# Patient Record
Sex: FEMALE | Race: WHITE | NOT HISPANIC OR LATINO | Employment: OTHER | URBAN - METROPOLITAN AREA
[De-identification: names, ages, dates, MRNs, and addresses within clinical notes are randomized per-mention and may not be internally consistent; named-entity substitution may affect disease eponyms.]

---

## 2022-12-03 ENCOUNTER — APPOINTMENT (OUTPATIENT)
Dept: RADIOLOGY | Facility: HOSPITAL | Age: 87
DRG: 193 | End: 2022-12-03
Attending: STUDENT IN AN ORGANIZED HEALTH CARE EDUCATION/TRAINING PROGRAM
Payer: COMMERCIAL

## 2022-12-03 ENCOUNTER — HOSPITAL ENCOUNTER (INPATIENT)
Facility: HOSPITAL | Age: 87
LOS: 3 days | Discharge: HOME | DRG: 193 | End: 2022-12-06
Attending: EMERGENCY MEDICINE | Admitting: FAMILY MEDICINE
Payer: COMMERCIAL

## 2022-12-03 DIAGNOSIS — R09.02 HYPOXIA: ICD-10-CM

## 2022-12-03 DIAGNOSIS — E87.1 HYPONATREMIA: ICD-10-CM

## 2022-12-03 DIAGNOSIS — J18.9 MULTIFOCAL PNEUMONIA: ICD-10-CM

## 2022-12-03 DIAGNOSIS — J10.1 INFLUENZA A: ICD-10-CM

## 2022-12-03 PROBLEM — Z66 DNR (DO NOT RESUSCITATE): Status: ACTIVE | Noted: 2021-12-09

## 2022-12-03 PROBLEM — M54.32 SCIATICA OF LEFT SIDE: Status: ACTIVE | Noted: 2017-03-14

## 2022-12-03 PROBLEM — Z96.651 STATUS POST TOTAL RIGHT KNEE REPLACEMENT: Status: ACTIVE | Noted: 2017-07-25

## 2022-12-03 PROBLEM — I10 HTN (HYPERTENSION): Status: ACTIVE | Noted: 2017-09-12

## 2022-12-03 PROBLEM — E88.819 INSULIN RESISTANCE: Status: ACTIVE | Noted: 2021-12-09

## 2022-12-03 PROBLEM — M17.11 PRIMARY OSTEOARTHRITIS OF RIGHT KNEE: Status: ACTIVE | Noted: 2017-07-25

## 2022-12-03 PROBLEM — M17.0 ARTHRITIS OF BOTH KNEES: Status: ACTIVE | Noted: 2017-03-14

## 2022-12-03 PROBLEM — M17.12 PRIMARY OSTEOARTHRITIS OF LEFT KNEE: Status: ACTIVE | Noted: 2017-09-12

## 2022-12-03 LAB
ALBUMIN SERPL BCG-MCNC: 3.5 G/DL (ref 3.5–5.2)
ALP SERPL-CCNC: 50 U/L (ref 35–104)
ALT SERPL W P-5'-P-CCNC: 29 U/L (ref 10–35)
ANION GAP SERPL CALCULATED.3IONS-SCNC: 12 MMOL/L (ref 7–15)
AST SERPL W P-5'-P-CCNC: 57 U/L (ref 10–35)
BASOPHILS # BLD AUTO: 0 10E3/UL (ref 0–0.2)
BASOPHILS NFR BLD AUTO: 0 %
BILIRUB SERPL-MCNC: 0.9 MG/DL
BUN SERPL-MCNC: 7.9 MG/DL (ref 8–23)
CALCIUM SERPL-MCNC: 8.4 MG/DL (ref 8.8–10.2)
CHLORIDE SERPL-SCNC: 84 MMOL/L (ref 98–107)
CREAT SERPL-MCNC: 0.63 MG/DL (ref 0.51–0.95)
DEPRECATED HCO3 PLAS-SCNC: 23 MMOL/L (ref 22–29)
EOSINOPHIL # BLD AUTO: 0 10E3/UL (ref 0–0.7)
EOSINOPHIL NFR BLD AUTO: 0 %
ERYTHROCYTE [DISTWIDTH] IN BLOOD BY AUTOMATED COUNT: 12.2 % (ref 10–15)
FLUAV RNA SPEC QL NAA+PROBE: POSITIVE
FLUBV RNA RESP QL NAA+PROBE: NEGATIVE
GFR SERPL CREATININE-BSD FRML MDRD: 86 ML/MIN/1.73M2
GLUCOSE SERPL-MCNC: 134 MG/DL (ref 70–99)
HCT VFR BLD AUTO: 32.3 % (ref 35–47)
HGB BLD-MCNC: 11.3 G/DL (ref 11.7–15.7)
IMM GRANULOCYTES # BLD: 0.1 10E3/UL
IMM GRANULOCYTES NFR BLD: 1 %
LYMPHOCYTES # BLD AUTO: 0.6 10E3/UL (ref 0.8–5.3)
LYMPHOCYTES NFR BLD AUTO: 5 %
MCH RBC QN AUTO: 30.8 PG (ref 26.5–33)
MCHC RBC AUTO-ENTMCNC: 35 G/DL (ref 31.5–36.5)
MCV RBC AUTO: 88 FL (ref 78–100)
MONOCYTES # BLD AUTO: 1 10E3/UL (ref 0–1.3)
MONOCYTES NFR BLD AUTO: 8 %
NEUTROPHILS # BLD AUTO: 10.5 10E3/UL (ref 1.6–8.3)
NEUTROPHILS NFR BLD AUTO: 86 %
NRBC # BLD AUTO: 0 10E3/UL
NRBC BLD AUTO-RTO: 0 /100
PLATELET # BLD AUTO: 188 10E3/UL (ref 150–450)
POTASSIUM SERPL-SCNC: 3.5 MMOL/L (ref 3.4–5.3)
PROT SERPL-MCNC: 6.4 G/DL (ref 6.4–8.3)
RBC # BLD AUTO: 3.67 10E6/UL (ref 3.8–5.2)
RSV RNA SPEC NAA+PROBE: NEGATIVE
SARS-COV-2 RNA RESP QL NAA+PROBE: NEGATIVE
SODIUM SERPL-SCNC: 119 MMOL/L (ref 136–145)
WBC # BLD AUTO: 12.1 10E3/UL (ref 4–11)

## 2022-12-03 PROCEDURE — 87637 SARSCOV2&INF A&B&RSV AMP PRB: CPT | Performed by: STUDENT IN AN ORGANIZED HEALTH CARE EDUCATION/TRAINING PROGRAM

## 2022-12-03 PROCEDURE — 36415 COLL VENOUS BLD VENIPUNCTURE: CPT | Performed by: EMERGENCY MEDICINE

## 2022-12-03 PROCEDURE — 93005 ELECTROCARDIOGRAM TRACING: CPT | Performed by: EMERGENCY MEDICINE

## 2022-12-03 PROCEDURE — 80053 COMPREHEN METABOLIC PANEL: CPT | Performed by: EMERGENCY MEDICINE

## 2022-12-03 PROCEDURE — 85025 COMPLETE CBC W/AUTO DIFF WBC: CPT | Performed by: EMERGENCY MEDICINE

## 2022-12-03 PROCEDURE — 120N000001 HC R&B MED SURG/OB

## 2022-12-03 PROCEDURE — 99285 EMERGENCY DEPT VISIT HI MDM: CPT | Mod: 25

## 2022-12-03 PROCEDURE — C9803 HOPD COVID-19 SPEC COLLECT: HCPCS

## 2022-12-03 PROCEDURE — 99223 1ST HOSP IP/OBS HIGH 75: CPT | Performed by: FAMILY MEDICINE

## 2022-12-03 PROCEDURE — 71046 X-RAY EXAM CHEST 2 VIEWS: CPT

## 2022-12-03 RX ORDER — ALBUTEROL SULFATE 0.83 MG/ML
3 SOLUTION RESPIRATORY (INHALATION)
Status: DISCONTINUED | OUTPATIENT
Start: 2022-12-03 | End: 2022-12-06 | Stop reason: HOSPADM

## 2022-12-03 RX ORDER — CEFTRIAXONE 1 G/1
1 INJECTION, POWDER, FOR SOLUTION INTRAMUSCULAR; INTRAVENOUS ONCE
Status: DISCONTINUED | OUTPATIENT
Start: 2022-12-03 | End: 2022-12-03

## 2022-12-03 RX ORDER — AZITHROMYCIN 250 MG/1
500 TABLET, FILM COATED ORAL ONCE
Status: DISCONTINUED | OUTPATIENT
Start: 2022-12-03 | End: 2022-12-03

## 2022-12-03 RX ORDER — GUAIFENESIN 200 MG/10ML
10 LIQUID ORAL EVERY 4 HOURS PRN
Status: DISCONTINUED | OUTPATIENT
Start: 2022-12-03 | End: 2022-12-06 | Stop reason: HOSPADM

## 2022-12-03 RX ORDER — IPRATROPIUM BROMIDE AND ALBUTEROL SULFATE 2.5; .5 MG/3ML; MG/3ML
3 SOLUTION RESPIRATORY (INHALATION) 4 TIMES DAILY
Status: DISCONTINUED | OUTPATIENT
Start: 2022-12-04 | End: 2022-12-05

## 2022-12-03 RX ORDER — CEFTRIAXONE 2 G/1
2 INJECTION, POWDER, FOR SOLUTION INTRAMUSCULAR; INTRAVENOUS EVERY 24 HOURS
Status: DISCONTINUED | OUTPATIENT
Start: 2022-12-03 | End: 2022-12-06

## 2022-12-03 RX ORDER — SODIUM CHLORIDE 9 MG/ML
INJECTION, SOLUTION INTRAVENOUS CONTINUOUS
Status: DISCONTINUED | OUTPATIENT
Start: 2022-12-03 | End: 2022-12-06

## 2022-12-03 RX ORDER — IPRATROPIUM BROMIDE AND ALBUTEROL SULFATE 2.5; .5 MG/3ML; MG/3ML
3 SOLUTION RESPIRATORY (INHALATION) ONCE
Status: COMPLETED | OUTPATIENT
Start: 2022-12-03 | End: 2022-12-04

## 2022-12-03 RX ORDER — LIDOCAINE 40 MG/G
CREAM TOPICAL
Status: DISCONTINUED | OUTPATIENT
Start: 2022-12-03 | End: 2022-12-06 | Stop reason: HOSPADM

## 2022-12-03 ASSESSMENT — ENCOUNTER SYMPTOMS
SHORTNESS OF BREATH: 1
VOMITING: 0
COUGH: 1
APPETITE CHANGE: 1
FEVER: 1
FATIGUE: 1
DIARRHEA: 1

## 2022-12-03 ASSESSMENT — ACTIVITIES OF DAILY LIVING (ADL): ADLS_ACUITY_SCORE: 33

## 2022-12-04 ENCOUNTER — APPOINTMENT (OUTPATIENT)
Dept: OCCUPATIONAL THERAPY | Facility: HOSPITAL | Age: 87
DRG: 193 | End: 2022-12-04
Attending: INTERNAL MEDICINE
Payer: COMMERCIAL

## 2022-12-04 LAB
ANION GAP SERPL CALCULATED.3IONS-SCNC: 8 MMOL/L (ref 7–15)
BASOPHILS # BLD AUTO: 0 10E3/UL (ref 0–0.2)
BASOPHILS NFR BLD AUTO: 0 %
BUN SERPL-MCNC: 6.6 MG/DL (ref 8–23)
CALCIUM SERPL-MCNC: 7.9 MG/DL (ref 8.8–10.2)
CHLORIDE SERPL-SCNC: 94 MMOL/L (ref 98–107)
CREAT SERPL-MCNC: 0.62 MG/DL (ref 0.51–0.95)
DEPRECATED HCO3 PLAS-SCNC: 27 MMOL/L (ref 22–29)
EOSINOPHIL # BLD AUTO: 0 10E3/UL (ref 0–0.7)
EOSINOPHIL NFR BLD AUTO: 0 %
ERYTHROCYTE [DISTWIDTH] IN BLOOD BY AUTOMATED COUNT: 12.2 % (ref 10–15)
GFR SERPL CREATININE-BSD FRML MDRD: 86 ML/MIN/1.73M2
GLUCOSE SERPL-MCNC: 105 MG/DL (ref 70–99)
HCT VFR BLD AUTO: 29.2 % (ref 35–47)
HGB BLD-MCNC: 10 G/DL (ref 11.7–15.7)
IMM GRANULOCYTES # BLD: 0.1 10E3/UL
IMM GRANULOCYTES NFR BLD: 1 %
LYMPHOCYTES # BLD AUTO: 1 10E3/UL (ref 0.8–5.3)
LYMPHOCYTES NFR BLD AUTO: 11 %
MAGNESIUM SERPL-MCNC: 2 MG/DL (ref 1.7–2.3)
MCH RBC QN AUTO: 30.4 PG (ref 26.5–33)
MCHC RBC AUTO-ENTMCNC: 34.2 G/DL (ref 31.5–36.5)
MCV RBC AUTO: 89 FL (ref 78–100)
MONOCYTES # BLD AUTO: 0.9 10E3/UL (ref 0–1.3)
MONOCYTES NFR BLD AUTO: 9 %
NEUTROPHILS # BLD AUTO: 7.5 10E3/UL (ref 1.6–8.3)
NEUTROPHILS NFR BLD AUTO: 79 %
NRBC # BLD AUTO: 0 10E3/UL
NRBC BLD AUTO-RTO: 0 /100
PLATELET # BLD AUTO: 147 10E3/UL (ref 150–450)
POTASSIUM SERPL-SCNC: 3.1 MMOL/L (ref 3.4–5.3)
POTASSIUM SERPL-SCNC: 3.5 MMOL/L (ref 3.4–5.3)
RBC # BLD AUTO: 3.29 10E6/UL (ref 3.8–5.2)
SODIUM SERPL-SCNC: 124 MMOL/L (ref 136–145)
SODIUM SERPL-SCNC: 129 MMOL/L (ref 136–145)
WBC # BLD AUTO: 9.4 10E3/UL (ref 4–11)

## 2022-12-04 PROCEDURE — 87205 SMEAR GRAM STAIN: CPT | Performed by: FAMILY MEDICINE

## 2022-12-04 PROCEDURE — 85025 COMPLETE CBC W/AUTO DIFF WBC: CPT | Performed by: FAMILY MEDICINE

## 2022-12-04 PROCEDURE — 99233 SBSQ HOSP IP/OBS HIGH 50: CPT | Performed by: INTERNAL MEDICINE

## 2022-12-04 PROCEDURE — 250N000009 HC RX 250: Performed by: FAMILY MEDICINE

## 2022-12-04 PROCEDURE — 97110 THERAPEUTIC EXERCISES: CPT | Mod: GO

## 2022-12-04 PROCEDURE — 84295 ASSAY OF SERUM SODIUM: CPT | Performed by: FAMILY MEDICINE

## 2022-12-04 PROCEDURE — 250N000013 HC RX MED GY IP 250 OP 250 PS 637: Performed by: INTERNAL MEDICINE

## 2022-12-04 PROCEDURE — 94640 AIRWAY INHALATION TREATMENT: CPT | Mod: 76

## 2022-12-04 PROCEDURE — 120N000001 HC R&B MED SURG/OB

## 2022-12-04 PROCEDURE — 999N000157 HC STATISTIC RCP TIME EA 10 MIN

## 2022-12-04 PROCEDURE — 250N000013 HC RX MED GY IP 250 OP 250 PS 637: Performed by: FAMILY MEDICINE

## 2022-12-04 PROCEDURE — 84132 ASSAY OF SERUM POTASSIUM: CPT | Performed by: INTERNAL MEDICINE

## 2022-12-04 PROCEDURE — 258N000003 HC RX IP 258 OP 636: Performed by: EMERGENCY MEDICINE

## 2022-12-04 PROCEDURE — 36415 COLL VENOUS BLD VENIPUNCTURE: CPT | Performed by: FAMILY MEDICINE

## 2022-12-04 PROCEDURE — 250N000009 HC RX 250: Performed by: EMERGENCY MEDICINE

## 2022-12-04 PROCEDURE — 250N000011 HC RX IP 250 OP 636: Performed by: FAMILY MEDICINE

## 2022-12-04 PROCEDURE — 258N000003 HC RX IP 258 OP 636: Performed by: INTERNAL MEDICINE

## 2022-12-04 PROCEDURE — 83735 ASSAY OF MAGNESIUM: CPT | Performed by: INTERNAL MEDICINE

## 2022-12-04 PROCEDURE — 36415 COLL VENOUS BLD VENIPUNCTURE: CPT | Performed by: INTERNAL MEDICINE

## 2022-12-04 PROCEDURE — 97165 OT EVAL LOW COMPLEX 30 MIN: CPT | Mod: GO

## 2022-12-04 PROCEDURE — 258N000003 HC RX IP 258 OP 636: Performed by: FAMILY MEDICINE

## 2022-12-04 PROCEDURE — 87077 CULTURE AEROBIC IDENTIFY: CPT | Performed by: FAMILY MEDICINE

## 2022-12-04 RX ORDER — ACETAMINOPHEN 325 MG/1
650 TABLET ORAL EVERY 4 HOURS PRN
Status: DISCONTINUED | OUTPATIENT
Start: 2022-12-04 | End: 2022-12-06 | Stop reason: HOSPADM

## 2022-12-04 RX ORDER — ONDANSETRON 2 MG/ML
4 INJECTION INTRAMUSCULAR; INTRAVENOUS EVERY 6 HOURS PRN
Status: DISCONTINUED | OUTPATIENT
Start: 2022-12-04 | End: 2022-12-06 | Stop reason: HOSPADM

## 2022-12-04 RX ORDER — HYDROXYZINE HYDROCHLORIDE 25 MG/1
50 TABLET, FILM COATED ORAL EVERY 6 HOURS PRN
Status: DISCONTINUED | OUTPATIENT
Start: 2022-12-04 | End: 2022-12-06 | Stop reason: HOSPADM

## 2022-12-04 RX ORDER — ACETAMINOPHEN 650 MG/1
650 SUPPOSITORY RECTAL EVERY 4 HOURS PRN
Status: DISCONTINUED | OUTPATIENT
Start: 2022-12-04 | End: 2022-12-06 | Stop reason: HOSPADM

## 2022-12-04 RX ORDER — PANTOPRAZOLE SODIUM 40 MG/1
40 TABLET, DELAYED RELEASE ORAL
Status: DISCONTINUED | OUTPATIENT
Start: 2022-12-05 | End: 2022-12-06 | Stop reason: HOSPADM

## 2022-12-04 RX ORDER — HYDROXYZINE HYDROCHLORIDE 25 MG/1
25 TABLET, FILM COATED ORAL EVERY 6 HOURS PRN
Status: DISCONTINUED | OUTPATIENT
Start: 2022-12-04 | End: 2022-12-06 | Stop reason: HOSPADM

## 2022-12-04 RX ORDER — POTASSIUM CHLORIDE 1500 MG/1
40 TABLET, EXTENDED RELEASE ORAL ONCE
Status: COMPLETED | OUTPATIENT
Start: 2022-12-04 | End: 2022-12-04

## 2022-12-04 RX ORDER — BENZONATATE 100 MG/1
100 CAPSULE ORAL 3 TIMES DAILY PRN
Status: DISCONTINUED | OUTPATIENT
Start: 2022-12-04 | End: 2022-12-06 | Stop reason: HOSPADM

## 2022-12-04 RX ORDER — POTASSIUM CHLORIDE 1500 MG/1
20 TABLET, EXTENDED RELEASE ORAL ONCE
Status: COMPLETED | OUTPATIENT
Start: 2022-12-04 | End: 2022-12-04

## 2022-12-04 RX ADMIN — IPRATROPIUM BROMIDE AND ALBUTEROL SULFATE 3 ML: 2.5; .5 SOLUTION RESPIRATORY (INHALATION) at 16:08

## 2022-12-04 RX ADMIN — AZITHROMYCIN 500 MG: 500 INJECTION, POWDER, LYOPHILIZED, FOR SOLUTION INTRAVENOUS at 01:02

## 2022-12-04 RX ADMIN — CEFTRIAXONE 2 G: 2 INJECTION, POWDER, FOR SOLUTION INTRAMUSCULAR; INTRAVENOUS at 00:51

## 2022-12-04 RX ADMIN — GUAIFENESIN 10 ML: 200 SOLUTION ORAL at 10:45

## 2022-12-04 RX ADMIN — GUAIFENESIN 10 ML: 200 SOLUTION ORAL at 22:13

## 2022-12-04 RX ADMIN — SODIUM CHLORIDE: 9 INJECTION, SOLUTION INTRAVENOUS at 14:30

## 2022-12-04 RX ADMIN — AZITHROMYCIN 250 MG: 500 INJECTION, POWDER, LYOPHILIZED, FOR SOLUTION INTRAVENOUS at 22:13

## 2022-12-04 RX ADMIN — IPRATROPIUM BROMIDE AND ALBUTEROL SULFATE 3 ML: 2.5; .5 SOLUTION RESPIRATORY (INHALATION) at 11:59

## 2022-12-04 RX ADMIN — SODIUM CHLORIDE 500 ML: 9 INJECTION, SOLUTION INTRAVENOUS at 00:10

## 2022-12-04 RX ADMIN — POTASSIUM CHLORIDE 40 MEQ: 1500 TABLET, EXTENDED RELEASE ORAL at 09:51

## 2022-12-04 RX ADMIN — IPRATROPIUM BROMIDE AND ALBUTEROL SULFATE 3 ML: 2.5; .5 SOLUTION RESPIRATORY (INHALATION) at 07:54

## 2022-12-04 RX ADMIN — SODIUM CHLORIDE: 9 INJECTION, SOLUTION INTRAVENOUS at 01:03

## 2022-12-04 RX ADMIN — POTASSIUM CHLORIDE 20 MEQ: 1500 TABLET, EXTENDED RELEASE ORAL at 16:31

## 2022-12-04 RX ADMIN — IPRATROPIUM BROMIDE AND ALBUTEROL SULFATE 3 ML: 2.5; .5 SOLUTION RESPIRATORY (INHALATION) at 22:13

## 2022-12-04 RX ADMIN — HYDROXYZINE HYDROCHLORIDE 25 MG: 25 TABLET, FILM COATED ORAL at 22:36

## 2022-12-04 RX ADMIN — IPRATROPIUM BROMIDE AND ALBUTEROL SULFATE 3 ML: 2.5; .5 SOLUTION RESPIRATORY (INHALATION) at 00:10

## 2022-12-04 ASSESSMENT — ACTIVITIES OF DAILY LIVING (ADL)
ADLS_ACUITY_SCORE: 35
ADLS_ACUITY_SCORE: 37
ADLS_ACUITY_SCORE: 35
ADLS_ACUITY_SCORE: 35
ADLS_ACUITY_SCORE: 37
DEPENDENT_IADLS:: INDEPENDENT
ADLS_ACUITY_SCORE: 37

## 2022-12-04 NOTE — PROGRESS NOTES
Occupational Therapy       12/04/22 1330   Appointment Info   Signing Clinician's Name / Credentials (OT) Jackie Park OTR/L   Living Environment   People in Home alone   Current Living Arrangements other (see comments)  ([facility])   Home Accessibility no concerns  (1-level, no stairs)   Living Environment Comments Patient independent with ADLs   Self-Care   Usual Activity Tolerance excellent   Regular Exercise Yes   Activity/Exercise Type swimming;walking  (water aerobics 5 days per week, walks everyday)   Exercise Amount/Frequency 30 mins   Equipment Currently Used at Home none   Fall history within last six months no   General Information   Onset of Illness/Injury or Date of Surgery 12/03/22   Referring Physician Elliott Courtney MD   Patient/Family Therapy Goal Statement (OT) none stated   Additional Occupational Profile Info/Pertinent History of Current Problem María Elena Ruff is a 87 year old female with no significant PMH who is admitted on 12/3/2022 due ot Influenza A and bilateral lower lobe PNA.   Existing Precautions/Restrictions no known precautions/restrictions   Cognitive Status Examination   Orientation Status orientation to person, place and time   Range of Motion Comprehensive   General Range of Motion no range of motion deficits identified   Strength Comprehensive (MMT)   General Manual Muscle Testing (MMT) Assessment no strength deficits identified   Bed Mobility   Bed Mobility supine-sit;sit-supine   Supine-Sit [place] (Bed Mobility) supervision   Sit-Supine [place] (Bed Mobility) supervision   Transfers   Transfers sit-stand transfer   Transfer Comments SBA for donning/doffing socks while seated EOB   Sit-Stand Transfer   Sit-Stand [place] (Transfers) supervision   Balance   Balance Comments SBA for sitting and standing balance (static and dynamic)   Clinical Impression   Criteria for Skilled Therapeutic Interventions Met (OT) Yes, treatment indicated   OT Diagnosis decreased  endurance for ADLs   Influenced by the following impairments influenza, pneumonia   OT Problem List-Impairments impacting ADL problems related to;activity tolerance impaired   Assessment of Occupational Performance 1-3 Performance Deficits   Identified Performance Deficits endurance for ADLs   Planned Therapy Interventions (OT) home program guidelines;progressive activity/exercise   Clinical Decision Making Complexity (OT) low complexity   Risk & Benefits of therapy have been explained evaluation/treatment results reviewed;care plan/treatment goals reviewed   OT Total Evaluation Time   OT Eval, Low Complexity Minutes (47791) 10   OT Goals   Therapy Frequency (OT) 5 times/wk   OT Predicted Duration/Target Date for Goal Attainment 12/11/22   OT Goals Aerobic Activity;Toilet Transfer/Toileting   OT: Toilet Transfer/Toileting Independent   OT: Perform aerobic activity with stable cardiovascular response continuous activity;10 minutes   Interventions   Interventions Quick Adds Therapeutic Procedures/Exercise   Therapeutic Procedures/Exercise   Therapeutic Procedure: strength, endurance, ROM, flexibillity minutes (51122) 10   Symptoms Noted During/After Treatment none   Treatment Detail/Skilled Intervention Patient standing marching in place 1 minute, repeated 4x. Patient completed BUE exercises x10 reps x3 sets. Oxgyen remained above 90% on 2L, cues for PLB throughout exercises   OT Discharge Planning   OT Plan Standing g/h, toileting, UE exercise-patient may only need 1-2 sessions   OT Discharge Recommendation (DC Rec) home with assist   OT Rationale for DC Rec Patient moving well, could have assistance from family if needed.   OT Brief overview of current status SBA for trsfs, dressing, bed mob   Total Session Time   Timed Code Treatment Minutes 10   Total Session Time (sum of timed and untimed services) 20

## 2022-12-04 NOTE — PLAN OF CARE
Problem: Plan of Care - These are the overarching goals to be used throughout the patient stay.    Goal: Optimal Comfort and Wellbeing  Outcome: Progressing     Pt comfortable overnight. No pain reported.  2L nasal canula used while sleeping. VSS.   Productive cough, sputum specimen sent.   One assist to the commode, loose stools overnight.   A/O 4x, makes needs known.

## 2022-12-04 NOTE — ED PROVIDER NOTES
EMERGENCY DEPARTMENT ENCOUNTER      NAME: María Elena Ruff  AGE: 87 year old female  YOB: 1935  MRN: [id number]  EVALUATION DATE & TIME: No admission date for patient encounter.    PCP: Kathrny Salgado    ED PROVIDER: Susie Clements M.D.      Chief Complaint   Patient presents with     Generalized Weakness     Cough     Diarrhea         FINAL IMPRESSION:  1. Multifocal pneumonia    2. Hypoxia    3. Hyponatremia    4. Influenza A        MEDICAL DECISION MAKIN:21 PM I met with the patient, obtained history, performed an initial exam, and discussed options and plan for diagnostics and treatment here in the ED.   Pertinent Labs & Imaging studies reviewed. (See chart for details)  10:39 PM I rechecked the patient and updated them on laboratory and imaging results.  10:57 PM Spoke with hospitalist Dr. Pinzon    María Elena Ruff is a 87 year old female who presents with increasing fatigue, shortness of breath, productive cough and generalized weakness.  Febrile at home but afebrile here.  She had a low oxygen saturation of 88% on room air at arrival.  Placed on oxygen by nasal cannula with improvement to 94%.  Currently on 2 L/min.  She was exposed to influenza a.  I suspect that this is the case as well.  Her exam shows crackles at both bases concerning for bilateral pneumonia.    Surprising, labs show hyponatremia at 119.  This does appear to be correct as there are no other significant dilutional abnormalities on her blood work.  Likely related to dehydration, poor p.o. intake and diarrhea.  I will initiate gentle repletion of her sodium with normal saline.  Additionally, she is influenza A positive and chest x-ray shows concomitant multifocal pneumonia.  Azithromycin and ceftriaxone ordered IV to begin treatment for this infection.  Unfortunately in regards to her influenza a, she has had greater than 3 days of symptoms so is not a candidate for treatment with Tamiflu.  She continues to require  oxygen at 2 L/min to maintain sats above 90%.    I updated the patient and her daughter.  They are in agreement with this plan of care.  I spoke with the hospitalist Dr. Pinzon who accepted the patient for further cares.  Patient is DNR/DNI.     Critical care: 25 minutes    MEDICATIONS GIVEN IN THE EMERGENCY:  Medications   ipratropium - albuterol 0.5 mg/2.5 mg/3 mL (DUONEB) neb solution 3 mL (has no administration in time range)   0.9% sodium chloride BOLUS (has no administration in time range)   lidocaine 1 % 0.1-1 mL (has no administration in time range)   lidocaine (LMX4) cream (has no administration in time range)   sodium chloride (PF) 0.9% PF flush 3 mL (has no administration in time range)   sodium chloride (PF) 0.9% PF flush 3 mL (has no administration in time range)   melatonin tablet 1 mg (has no administration in time range)   sodium chloride 0.9% infusion (has no administration in time range)   cefTRIAXone (ROCEPHIN) 2 g vial to attach to  ml bag for ADULTS or NS 50 ml bag for PEDS (has no administration in time range)   azithromycin (ZITHROMAX) 500 mg in sodium chloride 0.9 % 250 mL intermittent infusion (has no administration in time range)   azithromycin (ZITHROMAX) 250 mg in sodium chloride 0.9 % 250 mL intermittent infusion (has no administration in time range)   albuterol (PROVENTIL) neb solution 2.5 mg (has no administration in time range)   ipratropium - albuterol 0.5 mg/2.5 mg/3 mL (DUONEB) neb solution 3 mL (has no administration in time range)   guaiFENesin (ROBITUSSIN) 20 mg/mL solution 10 mL (has no administration in time range)         =================================================================    HPI    Patient information was obtained from: Patient and daughter     Use of : Use of : N/A       María Elena Ruff is a 87 year old female with no pertinent history on file who presents with generalized weakness, cough, and diarrhea.     Patient was in close  contact with her granddaughter who was sick on 11/24/2022. Patient started started feeling sick after and was seen at a clinic on Tuesday and was told she has influenza A. Patient was still not feeling better so she went back on Friday and was told she has pneumonia. Over the past week patient has been having on/off fevers with diarrhea, shortness of breath, loss of appetite, and generalized weakness.. Patient also developed a cough 3 days ago and describes sputum as brownish. Patient denies chest pain and vomiting.     REVIEW OF SYSTEMS   Review of Systems   Constitutional: Positive for appetite change, fatigue and fever.   Respiratory: Positive for cough and shortness of breath.    Cardiovascular: Negative for chest pain.   Gastrointestinal: Positive for diarrhea. Negative for vomiting.   All other systems reviewed and are negative.       PAST MEDICAL HISTORY:  Past Medical History:   Diagnosis Date     Arthritis        PAST SURGICAL HISTORY:  Past Surgical History:   Procedure Laterality Date     APPENDECTOMY       CLOSED REDUCTION, PERCUTANEOUS PINNING WRIST, COMBINED  1/31/2012    Procedure:COMBINED CLOSED REDUCTION, PERCUTANEOUS PINNING WRIST; Surgeon:LEANNE GUILLERMO; Location:[facility] OR     HYSTERECTOMY       REMOVE HARDWARE WRIST  3/27/2012    Procedure:REMOVE HARDWARE WRIST; Bilateral Distal Radius Deep Pin Out; Surgeon:LEANNE GUILLERMO; Location:[facility] OR     TONSILLECTOMY         CURRENT MEDICATIONS:      Current Facility-Administered Medications:      0.9% sodium chloride BOLUS, 500 mL, Intravenous, Once, Susie Clements MD     azithromycin (ZITHROMAX) tablet 500 mg, 500 mg, Oral, Once, Susie Clements MD     cefTRIAXone (ROCEPHIN) 1 g vial to attach to  mL bag for ADULTS or NS 50 mL bag for PEDS, 1 g, Intravenous, Once, Susie Clements MD     ipratropium - albuterol 0.5 mg/2.5 mg/3 mL (DUONEB) neb solution 3 mL, 3 mL, Nebulization, Once, Susie Clements MD    Current  "Outpatient Medications:      ASPIRIN ADULT LOW STRENGTH PO, Take 81 mg by mouth daily., Disp: , Rfl:      hydrocodone-acetaminophen 5-325 MG per tablet, Take 1 tablet by mouth every 6 hours as needed., Disp: , Rfl:      Multiple Vitamin (MULTIVITAMIN OR), Take  by mouth., Disp: , Rfl:      oxyCODONE-acetaminophen (PERCOCET) 5-325 MG per tablet, Take 1-2 tablets by mouth every 4 hours as needed for pain., Disp: 15 tablet, Rfl: 0    ALLERGIES:  No Known Allergies    FAMILY HISTORY:  No family history on file.    SOCIAL HISTORY:   Social History     Tobacco Use     Smoking status: Never     Smokeless tobacco: Never   Substance Use Topics     Alcohol use: No     Drug use: No        PHYSICAL EXAM:    Vitals: BP (!) 140/61   Pulse 84   Temp 99.8  F (37.7  C) (Oral)   Resp 22   Ht 1.6 m (5' 3\")   Wt 73 kg (161 lb)   SpO2 94%   BMI 28.52 kg/m     General:. Alert and interactive, appears fatigued. No respiratory distress.   HENT: Oropharynx without erythema or exudates. MMM.  TMs clear bilaterally. Dry mucus membranes.   Eyes: Pupils mid-sized and equally reactive.   Neck: Full AROM.  No midline tenderness to palpation.  Cardiovascular: Regular rate and rhythm. Peripheral pulses 2+ bilaterally.  Chest/Pulmonary: No chest wall tenderness or deformities. Left lung and right lung  bibasilar crackles with decreased air movement but no accessory muscle use or tachypnea.  Abdomen: Soft, nondistended. Nontender without guarding or rebound.  Back/Spine: No CVA or midline tenderness.  Extremities: Normal ROM of all major joints. No lower extremity edema.   Skin: Warm and dry. Normal skin color.   Neuro: Speech clear. CNs grossly intact. Moves all extremities appropriately. Strength and sensation grossly intact to all extremities.   Psych: Normal affect/mood, cooperative, memory appropriate.     LAB:  All pertinent labs reviewed and interpreted.  Labs Ordered and Resulted from Time of ED Arrival to Time of ED Departure "   COMPREHENSIVE METABOLIC PANEL - Abnormal       Result Value    Sodium 119 (*)     Potassium 3.5      Chloride 84 (*)     Carbon Dioxide (CO2) 23      Anion Gap 12      Urea Nitrogen 7.9 (*)     Creatinine 0.63      Calcium 8.4 (*)     Glucose 134 (*)     Alkaline Phosphatase 50      AST 57 (*)     ALT 29      Protein Total 6.4      Albumin 3.5      Bilirubin Total 0.9      GFR Estimate 86     INFLUENZA A/B & SARS-COV2 PCR MULTIPLEX - Abnormal    Influenza A PCR Positive (*)     Influenza B PCR Negative      RSV PCR Negative      SARS CoV2 PCR Negative     CBC WITH PLATELETS AND DIFFERENTIAL - Abnormal    WBC Count 12.1 (*)     RBC Count 3.67 (*)     Hemoglobin 11.3 (*)     Hematocrit 32.3 (*)     MCV 88      MCH 30.8      MCHC 35.0      RDW 12.2      Platelet Count 188      % Neutrophils 86      % Lymphocytes 5      % Monocytes 8      % Eosinophils 0      % Basophils 0      % Immature Granulocytes 1      NRBCs per 100 WBC 0      Absolute Neutrophils 10.5 (*)     Absolute Lymphocytes 0.6 (*)     Absolute Monocytes 1.0      Absolute Eosinophils 0.0      Absolute Basophils 0.0      Absolute Immature Granulocytes 0.1      Absolute NRBCs 0.0     SODIUM   RESPIRATORY AEROBIC BACTERIAL CULTURE   GRAM STAIN       RADIOLOGY:  XR Chest 2 Views   Final Result   IMPRESSION: Consolidation of both lower lungs consistent with pneumonia. There also appears to be more mild patchy involvement in the right upper lobe. Mild interstitial prominence. Normal heart size. No pneumothorax. Degenerative changes of the right    glenohumeral joint.          EKG:   Performed at: 3-Dec-2022  Impression: Normal sinus rhythm.  No signs of ACS or arrhythmia.  No abnormal intervals.  Rate: 81  Rhythm: Sinus  QRS Interval: 100  QTc Interval: 434  Comparison: No prior for comparison  I have independently reviewed and interpreted the EKG(s) documented above.       PROCEDURES:     Critical Care     Performed by: Dr Susie Clements  Total critical care  time:25 minutes  Critical care was necessary to treat or prevent imminent or life-threatening deterioration of the following conditions: Hyponatremia, hypoxia and multifocal pneumonia  Critical care was time spent personally by me on the following activities: development of treatment plan with patient or surrogate, discussions with consultants, examination of patient, evaluation of patient's response to treatment, obtaining history from patient or surrogate, ordering and performing treatments and interventions, ordering and review of laboratory studies, ordering and review of radiographic studies, re-evaluation of patient's condition and monitoring for potential decompensation.  Critical care time was exclusive of separately billable procedures and treating other patients.      I, Sebas Flores, am serving as a scribe to document services personally performed by Dr. Susie Clements  based on my observation and the provider's statements to me. I, Susie Clements MD attest that Sebas Flores is acting in a scribe capacity, has observed my performance of the services and has documented them in accordance with my direction.      Susie Clements M.D.  Emergency Medicine  [facility] EMERGENCY DEPARTMENT  [address] [place] [id number]  [phone]  Dept: [phone]           Susie Clements MD  12/03/22 2314

## 2022-12-04 NOTE — CONSULTS
"Care Management Initial Consult    General Information  Assessment completed with: Tang Stearns via phone  Type of CM/SW Visit: Initial Assessment    Primary Care Provider verified and updated as needed: Yes   Readmission within the last 30 days: no previous admission in last 30 days      Reason for Consult: discharge planning  Advance Care Planning: Advance Care Planning Reviewed: present on chart          Communication Assessment  Patient's communication style: spoken language (English or Bilingual)                          Living Environment:   People in home: alone     Current living Arrangements: house (\"town house\")      Able to return to prior arrangements: other (see comments) (unknown needs at this time)       Family/Social Support:  Care provided by: self  Provides care for: no one  Marital Status:   Children          Description of Support System: Supportive, Involved    Support Assessment: Adequate family and caregiver support, Adequate social supports, Patient communicates needs well met    Current Resources:   Patient receiving home care services: No     Community Resources: None  Equipment currently used at home: none  Supplies currently used at home: None    Employment/Financial:  Employment Status: retired        Financial Concerns:     Referral to Financial Worker: No       Lifestyle & Psychosocial Needs:  Social Determinants of Health     Tobacco Use: Low Risk      Smoking Tobacco Use: Never     Smokeless Tobacco Use: Never     Passive Exposure: Not on file   Alcohol Use: Not on file   Financial Resource Strain: Not on file   Food Insecurity: Not on file   Transportation Needs: Not on file   Physical Activity: Not on file   Stress: Not on file   Social Connections: Not on file   Intimate Partner Violence: Not on file   Depression: Not on file   Housing Stability: Not on file       Functional Status:  Prior to admission patient needed assistance:   Dependent ADLs:: Independent, Ambulation-no " assistive device  Dependent IADLs:: Independent       Mental Health Status:          Chemical Dependency Status:                Values/Beliefs:  Spiritual, Cultural Beliefs, Christianity Practices, Values that affect care:                 Additional Information:  Tang lives in a town house alone. She is independent with ADLs at baseline.    Unknown discharge needs at this time, but should be able to return home.    Family to transport at discharge.    Rupa Henry RN

## 2022-12-04 NOTE — PHARMACY-ADMISSION MEDICATION HISTORY
Pharmacy Note - Admission Medication History    Pertinent Provider Information: no RX meds     ______________________________________________________________________    Prior To Admission (PTA) med list completed and updated in EMR.       PTA Med List   Medication Sig Last Dose     cholecalciferol 25 MCG (1000 UT) TABS Take 1 tablet by mouth daily Vit D SPRAY at home 12/3/2022     Multiple Vitamin (MULTIVITAMIN OR) Take 1 tablet by mouth daily Packet of multiple supplements/vitamins 12/3/2022       Information source(s): Patient, Family member and CareEverywhere/SureScripts  Method of interview communication: in-person    Summary of Changes to PTA Med List  New: vit d spray  Discontinued: asa  Changed: none    Patient was asked about OTC/herbal products specifically.  PTA med list reflects this.    In the past week, patient estimated taking medication this percent of the time:  greater than 90%.    Allergies were reviewed, assessed, and updated with the patient.      Patient does not use any multi-dose medications prior to admission.    The information provided in this note is only as accurate as the sources available at the time of the update(s).    Thank you for the opportunity to participate in the care of this patient.    Floresita Armenta [facility]  12/3/2022 11:06 PM

## 2022-12-04 NOTE — H&P
"[facility]    History and Physical - Hospitalist Service       Date of Admission:  12/3/2022    Assessment & Plan      María Elena Ruff is a 87 year old female with no significant PMH who is admitted on 12/3/2022 due ot Influenza A and bilateral lower lobe PNA.     1.Acute Hypoxemic Respiratory Failure- Admit patient. CXR consistent with PNA. Patient is also Flu A+. Continue IV abx- Rocephin & Azithromycin, Duonebs. Supplemental O2. Monitor vitals closely. Repeat labs in a.m. Will make further recommendations based on clinical course.    2. Community Acquired Pneumonia- Seen on CXR. Continue Rocephin and Azithromycin. Duonebs. Supplemental O2. Monitor vitals. Repeat labs in a.m.    3. Influenza A- Supportive measures. Duonebs. Supplemental O2.    4. Hyponatremia-  Hypovolemic. Patient has had diarrhea and decreased PO intake for a few days. Give IVF. Monitor sodium levels.    5. Diarrhea- Likey 2/2 to influenza. IV hydration. Monitor electrolytes.     Diet: Combination Diet Regular Diet Adult    DVT Prophylaxis: Pneumatic Compression Devices  Stubbs Catheter: Not present  Central Lines: None  Cardiac Monitoring: ACTIVE order. Indication: Hypoxic on arrival  Code Status:  DNR per patient. (Daughter is witness at bedside and states patient also has an advanced directive stating DNR.)    Clinically Significant Risk Factors Present on Admission         # Hyponatremia: Lowest Na = 119 mmol/L (Ref range: 136-145) in last 2 days, will monitor as appropriate  # Hypocalcemia: Lowest Ca = 8.4 mg/dL (Ref range: 8.5 - 10.1 mg/dL) in last 2 days, will monitor and replace as appropriate             # Overweight: Estimated body mass index is 28.52 kg/m  as calculated from the following:    Height as of this encounter: 1.6 m (5' 3\").    Weight as of this encounter: 73 kg (161 lb).           Disposition Plan      Expected Discharge Date: 12/05/2022                The patient's care was discussed with the " Patient, Patient's Family and Emergency Medicine Physician.    Carmela Perez MD  Hospitalist Service  [facility]  Securely message with the [employer] Web Console (learn more here)  Text page via [facility] Paging/Directory         ______________________________________________________________________    Chief Complaint   Shortness of breath, coughing    History is obtained from the patient    History of Present Illness   María Elena Ruff is a 87 year old female with no significant PMH who is admitted on 12/3/2022 due ot Influenza A and bilateral lower lobe PNA. She was exposed to Influenza A 10 days ago during [employer] family gathering. Three days later she developed cough and shortness of breath that has progressively worsened. Associated symptoms include diarrhea and subjective fever. Patient denies chest pain, nausea, vomiting, abdominal pain, lightheadedness or dizziness. Patient currently appear tired, but otherwise in no acute distress.    Review of Systems    The 10 point Review of Systems is negative other than noted in the HPI.    Past Medical History    I have reviewed this patient's medical history and updated it with pertinent information if needed.   Past Medical History:   Diagnosis Date     Arthritis        Past Surgical History   I have reviewed this patient's surgical history and updated it with pertinent information if needed.  Past Surgical History:   Procedure Laterality Date     APPENDECTOMY       CLOSED REDUCTION, PERCUTANEOUS PINNING WRIST, COMBINED  1/31/2012    Procedure:COMBINED CLOSED REDUCTION, PERCUTANEOUS PINNING WRIST; Surgeon:LEANNE GUILLERMO; Location:[facility] OR     HYSTERECTOMY       REMOVE HARDWARE WRIST  3/27/2012    Procedure:REMOVE HARDWARE WRIST; Bilateral Distal Radius Deep Pin Out; Surgeon:LEANNE GUILLERMO; Location:[facility] OR     TONSILLECTOMY         Social History   I have reviewed this patient's social history and updated it with pertinent  information if needed.  Social History     Tobacco Use     Smoking status: Never     Smokeless tobacco: Never   Substance Use Topics     Alcohol use: No     Drug use: No       Family History   Patient denies any medical problems in her family.    Prior to Admission Medications   Prior to Admission Medications   Prescriptions Last Dose Informant Patient Reported? Taking?   Multiple Vitamin (MULTIVITAMIN OR) 12/3/2022  Yes Yes   Sig: Take 1 tablet by mouth daily Packet of multiple supplements/vitamins   cholecalciferol 25 MCG (1000 UT) TABS 12/3/2022  Yes Yes   Sig: Take 1 tablet by mouth daily Vit D SPRAY at home      Facility-Administered Medications: None     Allergies   No Known Allergies    Physical Exam   Vital Signs: Temp: 99.8  F (37.7  C) Temp src: Oral BP: (!) 140/61 Pulse: 84   Resp: 22 SpO2: 94 % O2 Device: Nasal cannula Oxygen Delivery: 2 LPM  Weight: 161 lbs 0 oz    General Appearance: AAOx3, NAD, appears tired  Eyes: Conjunctiva clear  HEENT: dry mucous membranes  Respiratory: Scattered coarse breath sounds and wheezes bilaterally  Cardiovascular: Regular rate, s1s2  GI: +BS, soft, NT,ND  Genitourinary: deferred  Musculoskeletal: No c/c/e  Neurologic: No focal deficits      Data   Data reviewed today: I reviewed all medications, new labs and imaging results over the last 24 hours. I personally reviewed the chest x-ray image(s) showing consolidation of both lung bases..    Recent Labs   Lab 12/03/22  2146   WBC 12.1*   HGB 11.3*   MCV 88      *   POTASSIUM 3.5   CHLORIDE 84*   CO2 23   BUN 7.9*   CR 0.63   ANIONGAP 12   TEJAL 8.4*   *   ALBUMIN 3.5   PROTTOTAL 6.4   BILITOTAL 0.9   ALKPHOS 50   ALT 29   AST 57*

## 2022-12-04 NOTE — PROGRESS NOTES
[facility]    PROGRESS NOTE - Hospitalist Service    Assessment and Plan    Principal Problem:    Hyponatremia  Active Problems:    Hypoxia    Multifocal pneumonia    87 year old female with unremarkable past medical history who presented to ER for evaluation of cough and shortness of breath, she was found to have Influenza A and bilateral lower lobe PNA.   She was admitted with     Acute Hypoxemic Respiratory Failure  - CXR consistent with PNA.   - Patient is also Flu A+.   -  Continue IV abx- Rocephin & Azithromycin, Duonebs.   - Still hypoxic and required oxygen     Community Acquired Pneumonia  - Post COVID infection, patient has been sick for a week  - Seen on CXR.   - Continue Rocephin and Azithromycin. Duonebs.   - Supplemental O2 as above.    Acute Influenza A-  - positive sick contact as her grandson is positive since Thanksgiving.  - Patient has symptoms for more than a week.    - Patient is outside window for Tamiflu   - Continue supportive care    Acute hyponatremia  - Hypovolemic.   - Patient has had diarrhea and decreased PO intake for a few days plus diarrhea.    -Improving with IVF.  Decrease rate to 50 cc  - continue monitor sodium levels.     Diarrhea  - Likey 2/2 to influenza.  - Continue with IV hydration.  -  Monitor electrolytes.     Hypokalemia  - Secondary to diarrhea and poor oral intake  - Replace per protocol.    Weakness and deconditioning  - PT/OT evaluation    VTE prophylaxis:  Pneumatic Compression Devices  DIET: Orders Placed This Encounter      Combination Diet Regular Diet Adult      Disposition/Barriers to discharge: IV fluid, IV antibiotics, hypoxia  Code Status: Prior    Subjective:  María Elena is feeling slightly better today, still has some weakness and coughing.  Denies any chest pain.    PHYSICAL EXAM  Vitals:    12/03/22 2114   Weight: 73 kg (161 lb)     B/P:139/65 T:98.4 P:75 R:20     Intake/Output Summary (Last 24 hours) at 12/4/2022 1235  Last data  filed at 12/4/2022 1000  Gross per 24 hour   Intake 1075 ml   Output --   Net 1075 ml      Body mass index is 28.52 kg/m .    Constitutional: awake, alert, cooperative, no apparent distress, and appears stated age  Eyes: Lids and lashes normal, pupils equal, round and reactive to light, extra ocular muscles intact, sclera clear, conjunctiva normal  ENT: Normocephalic, without obvious abnormality, atraumatic, sinuses nontender on palpation, external ears without lesions, oral pharynx with moist mucous membranes, tonsils without erythema or exudates, gums normal and good dentition.  Respiratory: Decreased breath sounds lung base with scattered rhonchi.  Expiratory wheeze  Cardiovascular: Normal apical impulse, regular rate and rhythm, normal S1 and S2, no S3 or S4, and no murmur noted  GI: No scars, normal bowel sounds, soft, non-distended, non-tender, no masses palpated, no hepatosplenomegally  Skin: no bruising or bleeding and normal skin color, texture, turgor  Musculoskeletal: There is no redness, warmth, or swelling of the joints.  Full range of motion noted.  no lower extremity pitting edema present  Neurologic: Awake, alert, oriented to name, place and time.  Cranial nerves II-XII are grossly intact.  Motor is 5 out of 5 bilaterally.   Sensory is intact.    Neuropsychiatric: Appropriate with examiner      PERTINENT LABS/IMAGING:  Recent Labs   Lab 12/04/22  0653 12/04/22  0214 12/03/22  2146   WBC 9.4  --  12.1*   HGB 10.0*  --  11.3*   MCV 89  --  88   *  --  188   * 124* 119*   POTASSIUM 3.1*  --  3.5   CHLORIDE 94*  --  84*   CO2 27  --  23   BUN 6.6*  --  7.9*   CR 0.62  --  0.63   ANIONGAP 8  --  12   TJEAL 7.9*  --  8.4*   *  --  134*   ALBUMIN  --   --  3.5   PROTTOTAL  --   --  6.4   BILITOTAL  --   --  0.9   ALKPHOS  --   --  50   ALT  --   --  29   AST  --   --  57*     Recent Results (from the past 24 hour(s))   XR Chest 2 Views    Narrative    EXAM: XR CHEST 2 VIEWS  LOCATION: [facility]  DATE/TIME: 12/3/2022 10:09 PM    INDICATION: Cough, shortness of breath  COMPARISON: None.      Impression    IMPRESSION: Consolidation of both lower lungs consistent with pneumonia. There also appears to be more mild patchy involvement in the right upper lobe. Mild interstitial prominence. Normal heart size. No pneumothorax. Degenerative changes of the right   glenohumeral joint.       Discussed with patient, family, nursing staff and discharge planner    Elliott Courtney MD  [facility] Medicine Service  [phone]

## 2022-12-04 NOTE — ED TRIAGE NOTES
"Patient arrives to triage with chief complaint of increased shortness of breath with exertion, increased generalized weakness and fatigue since Sunday.  Patient endorses productive cough, describes sputum as \"brownish.\"  Patient has also had diarrhea as well.  Patient's daughter reports grand-daughter had recent influenza and patient was around her.  Alert and oriented x4.       "

## 2022-12-05 ENCOUNTER — APPOINTMENT (OUTPATIENT)
Dept: PHYSICAL THERAPY | Facility: HOSPITAL | Age: 87
DRG: 193 | End: 2022-12-05
Attending: INTERNAL MEDICINE
Payer: COMMERCIAL

## 2022-12-05 ENCOUNTER — APPOINTMENT (OUTPATIENT)
Dept: OCCUPATIONAL THERAPY | Facility: HOSPITAL | Age: 87
DRG: 193 | End: 2022-12-05
Payer: COMMERCIAL

## 2022-12-05 LAB
ALBUMIN SERPL BCG-MCNC: 2.8 G/DL (ref 3.5–5.2)
ALP SERPL-CCNC: 47 U/L (ref 35–104)
ALT SERPL W P-5'-P-CCNC: 27 U/L (ref 10–35)
ANION GAP SERPL CALCULATED.3IONS-SCNC: 7 MMOL/L (ref 7–15)
AST SERPL W P-5'-P-CCNC: 35 U/L (ref 10–35)
BILIRUB SERPL-MCNC: 0.3 MG/DL
BUN SERPL-MCNC: 4.7 MG/DL (ref 8–23)
CALCIUM SERPL-MCNC: 8 MG/DL (ref 8.8–10.2)
CHLORIDE SERPL-SCNC: 99 MMOL/L (ref 98–107)
CREAT SERPL-MCNC: 0.58 MG/DL (ref 0.51–0.95)
DEPRECATED HCO3 PLAS-SCNC: 27 MMOL/L (ref 22–29)
ERYTHROCYTE [DISTWIDTH] IN BLOOD BY AUTOMATED COUNT: 12.6 % (ref 10–15)
GFR SERPL CREATININE-BSD FRML MDRD: 88 ML/MIN/1.73M2
GLUCOSE SERPL-MCNC: 97 MG/DL (ref 70–99)
HCT VFR BLD AUTO: 28.3 % (ref 35–47)
HGB BLD-MCNC: 9.5 G/DL (ref 11.7–15.7)
MCH RBC QN AUTO: 30.7 PG (ref 26.5–33)
MCHC RBC AUTO-ENTMCNC: 33.6 G/DL (ref 31.5–36.5)
MCV RBC AUTO: 92 FL (ref 78–100)
PLATELET # BLD AUTO: 184 10E3/UL (ref 150–450)
POTASSIUM SERPL-SCNC: 4.1 MMOL/L (ref 3.4–5.3)
PROT SERPL-MCNC: 5.4 G/DL (ref 6.4–8.3)
RBC # BLD AUTO: 3.09 10E6/UL (ref 3.8–5.2)
SODIUM SERPL-SCNC: 133 MMOL/L (ref 136–145)
WBC # BLD AUTO: 7.8 10E3/UL (ref 4–11)

## 2022-12-05 PROCEDURE — 120N000001 HC R&B MED SURG/OB

## 2022-12-05 PROCEDURE — 258N000003 HC RX IP 258 OP 636: Performed by: INTERNAL MEDICINE

## 2022-12-05 PROCEDURE — 36415 COLL VENOUS BLD VENIPUNCTURE: CPT | Performed by: INTERNAL MEDICINE

## 2022-12-05 PROCEDURE — 250N000013 HC RX MED GY IP 250 OP 250 PS 637: Performed by: INTERNAL MEDICINE

## 2022-12-05 PROCEDURE — 85027 COMPLETE CBC AUTOMATED: CPT | Performed by: INTERNAL MEDICINE

## 2022-12-05 PROCEDURE — 250N000009 HC RX 250: Performed by: FAMILY MEDICINE

## 2022-12-05 PROCEDURE — 94640 AIRWAY INHALATION TREATMENT: CPT

## 2022-12-05 PROCEDURE — 94660 CPAP INITIATION&MGMT: CPT

## 2022-12-05 PROCEDURE — 97161 PT EVAL LOW COMPLEX 20 MIN: CPT | Mod: GP | Performed by: PHYSICAL THERAPIST

## 2022-12-05 PROCEDURE — 80053 COMPREHEN METABOLIC PANEL: CPT | Performed by: INTERNAL MEDICINE

## 2022-12-05 PROCEDURE — 97116 GAIT TRAINING THERAPY: CPT | Mod: GP | Performed by: PHYSICAL THERAPIST

## 2022-12-05 PROCEDURE — 97535 SELF CARE MNGMENT TRAINING: CPT | Mod: GO

## 2022-12-05 PROCEDURE — 999N000157 HC STATISTIC RCP TIME EA 10 MIN

## 2022-12-05 PROCEDURE — 272N000202 HC AEROBIKA WITH MANOMETER

## 2022-12-05 PROCEDURE — 94640 AIRWAY INHALATION TREATMENT: CPT | Mod: 76

## 2022-12-05 PROCEDURE — 258N000003 HC RX IP 258 OP 636: Performed by: FAMILY MEDICINE

## 2022-12-05 PROCEDURE — 99232 SBSQ HOSP IP/OBS MODERATE 35: CPT | Performed by: INTERNAL MEDICINE

## 2022-12-05 PROCEDURE — 97110 THERAPEUTIC EXERCISES: CPT | Mod: GO

## 2022-12-05 PROCEDURE — G0463 HOSPITAL OUTPT CLINIC VISIT: HCPCS

## 2022-12-05 PROCEDURE — 250N000011 HC RX IP 250 OP 636: Performed by: FAMILY MEDICINE

## 2022-12-05 RX ORDER — LACTOBACILLUS RHAMNOSUS GG 10B CELL
1 CAPSULE ORAL 2 TIMES DAILY
Status: DISCONTINUED | OUTPATIENT
Start: 2022-12-05 | End: 2022-12-06 | Stop reason: HOSPADM

## 2022-12-05 RX ORDER — IPRATROPIUM BROMIDE AND ALBUTEROL SULFATE 2.5; .5 MG/3ML; MG/3ML
3 SOLUTION RESPIRATORY (INHALATION) EVERY 4 HOURS PRN
Status: DISCONTINUED | OUTPATIENT
Start: 2022-12-05 | End: 2022-12-06 | Stop reason: HOSPADM

## 2022-12-05 RX ADMIN — AZITHROMYCIN 250 MG: 500 INJECTION, POWDER, LYOPHILIZED, FOR SOLUTION INTRAVENOUS at 22:35

## 2022-12-05 RX ADMIN — IPRATROPIUM BROMIDE AND ALBUTEROL SULFATE 3 ML: 2.5; .5 SOLUTION RESPIRATORY (INHALATION) at 08:19

## 2022-12-05 RX ADMIN — BENZONATATE 100 MG: 100 CAPSULE ORAL at 22:19

## 2022-12-05 RX ADMIN — CEFTRIAXONE 2 G: 2 INJECTION, POWDER, FOR SOLUTION INTRAMUSCULAR; INTRAVENOUS at 00:33

## 2022-12-05 RX ADMIN — BENZONATATE 100 MG: 100 CAPSULE ORAL at 14:23

## 2022-12-05 RX ADMIN — Medication 1 CAPSULE: at 19:40

## 2022-12-05 RX ADMIN — HYDROXYZINE HYDROCHLORIDE 25 MG: 25 TABLET, FILM COATED ORAL at 22:19

## 2022-12-05 RX ADMIN — BENZONATATE 100 MG: 100 CAPSULE ORAL at 09:35

## 2022-12-05 RX ADMIN — IPRATROPIUM BROMIDE AND ALBUTEROL SULFATE 3 ML: 2.5; .5 SOLUTION RESPIRATORY (INHALATION) at 19:43

## 2022-12-05 RX ADMIN — IPRATROPIUM BROMIDE AND ALBUTEROL SULFATE 3 ML: 2.5; .5 SOLUTION RESPIRATORY (INHALATION) at 16:14

## 2022-12-05 RX ADMIN — SODIUM CHLORIDE: 9 INJECTION, SOLUTION INTRAVENOUS at 14:23

## 2022-12-05 RX ADMIN — IPRATROPIUM BROMIDE AND ALBUTEROL SULFATE 3 ML: 2.5; .5 SOLUTION RESPIRATORY (INHALATION) at 13:06

## 2022-12-05 RX ADMIN — PANTOPRAZOLE SODIUM 40 MG: 40 TABLET, DELAYED RELEASE ORAL at 09:35

## 2022-12-05 ASSESSMENT — ACTIVITIES OF DAILY LIVING (ADL)
ADLS_ACUITY_SCORE: 38

## 2022-12-05 NOTE — PROGRESS NOTES
"ROOM AIR SpO2  99 %. BS diminished with crackles bases. Duoneb tx given, tolerated well, BS I//wheezes RUL, diminished with crackles bases after    BP (!) 156/72   Pulse 82   Temp 98.7  F (37.1  C)   Resp 20   Ht 1.6 m (5' 3\")   Wt 73 kg (161 lb)   SpO2 93%   BMI 28.52 kg/m      RT to monitor  "

## 2022-12-05 NOTE — PLAN OF CARE
Goal Outcome Evaluation:    Pt denies pain, alert and oriented.   Room air, sating >95%. Scheduled nebs given. LS diminished with ex wheezes.   IV antibiotics given.   Family at bedside, Droplet precautions in place.

## 2022-12-05 NOTE — PROGRESS NOTES
"Room air SPO2 *95 %, BS diiminished bases with fine crackles LLL. Duoneb tx given, tolerated well. BS SAME after    BP (!) 154/74   Pulse 68   Temp 98.7  F (37.1  C)   Resp 20   Ht 1.6 m (5' 3\")   Wt 73 kg (161 lb)   SpO2 95%   BMI 28.52 kg/m      RT TO MONITOR   "

## 2022-12-05 NOTE — PROGRESS NOTES
[facility]    PROGRESS NOTE - Hospitalist Service    Assessment and Plan    Principal Problem:    Hyponatremia  Active Problems:    Hypoxia    Multifocal pneumonia    87 year old female with unremarkable past medical history who presented to ER for evaluation of cough and shortness of breath, she was found to have Influenza A and bilateral lower lobe PNA.   She was admitted with     Acute Hypoxemic Respiratory Failure  - Secondary to pneumonia and influenza  - CXR consistent with PNA.   - Patient is also Flu A+.   - Continue IV abx- Rocephin & Azithromycin, Duonebs.   - Improving hypoxia and weaning off oxygen     Community Acquired Pneumonia  - Post influenza infection, patient has been sick for a week  - Pneumonia is seen on CXR.   - Continue Rocephin and Azithromycin.   - Continue Duonebs.   - Supplemental O2 as above.     Acute Influenza A-  - positive sick contact as her grandson is positive since Thanksgiving.  - Patient has symptoms for more than a week.    - Patient is outside window for Tamiflu   - Improving slowly  - Continue supportive care     Acute hyponatremia  - Hypovolemic hyponatremic  - Patient has had diarrhea and decreased PO intake for a few days plus diarrhea.    -Improving with IVF.    - Decrease rate to 50 cc  - continue monitor sodium levels.     Diarrhea  - Likey 2/2 to influenza.  - Improving  - Continue with IV hydration.  -  Monitor electrolytes.     Hypokalemia  - Secondary to diarrhea and poor oral intake  - Replace per protocol.     Weakness and deconditioning  - PT/OT evaluation  -Stable from discharge    VTE prophylaxis:  Pneumatic Compression Devices  DIET: Orders Placed This Encounter      Combination Diet Regular Diet Adult      Disposition/Barriers to discharge: IV fluid, IV antibiotic and clinical improvement  Code Status: Full Code    Subjective:  María Elena is feeling better today, still has some cough.  Improving shortness of breath.  Denies chest pain.   No diarrhea overnight.    PHYSICAL EXAM  Vitals:    12/03/22 2114   Weight: 73 kg (161 lb)     B/P:138/65 T:98.4 P:85 R:24     Intake/Output Summary (Last 24 hours) at 12/5/2022 1529  Last data filed at 12/4/2022 2130  Gross per 24 hour   Intake 240 ml   Output --   Net 240 ml      Body mass index is 28.52 kg/m .    Constitutional: awake, alert, cooperative, no apparent distress, and appears stated age  Respiratory: Decreased breath sounds lung base with scattered rhonchi.  No rales.  Cardiovascular: Normal apical impulse, regular rate and rhythm, normal S1 and S2, no S3 or S4, and no murmur noted  GI: No scars, normal bowel sounds, soft, non-distended, non-tender, no masses palpated, no hepatosplenomegally  Skin: no bruising or bleeding and normal skin color, texture, turgor  Musculoskeletal: There is no redness, warmth, or swelling of the joints.  Full range of motion noted.  no lower extremity pitting edema present  Neurologic: Awake, alert, oriented to name, place and time.  Cranial nerves II-XII are grossly intact.  Motor is 5 out of 5 bilaterally.   Sensory is intact.    Neuropsychiatric: Appropriate with examiner      PERTINENT LABS/IMAGING:  Recent Labs   Lab 12/05/22  0715 12/04/22  1444 12/04/22  0653 12/04/22  0214 12/03/22  2146   WBC 7.8  --  9.4  --  12.1*   HGB 9.5*  --  10.0*  --  11.3*   MCV 92  --  89  --  88     --  147*  --  188   *  --  129* 124* 119*   POTASSIUM 4.1 3.5 3.1*  --  3.5   CHLORIDE 99  --  94*  --  84*   CO2 27  --  27  --  23   BUN 4.7*  --  6.6*  --  7.9*   CR 0.58  --  0.62  --  0.63   ANIONGAP 7  --  8  --  12   TEJAL 8.0*  --  7.9*  --  8.4*   GLC 97  --  105*  --  134*   ALBUMIN 2.8*  --   --   --  3.5   PROTTOTAL 5.4*  --   --   --  6.4   BILITOTAL 0.3  --   --   --  0.9   ALKPHOS 47  --   --   --  50   ALT 27  --   --   --  29   AST 35  --   --   --  57*     No results found for this or any previous visit (from the past 24 hour(s)).    Discussed with patient,  family, nursing staff and discharge planner    Elliott Courtney MD  [facility] Medicine Service  [phone]

## 2022-12-05 NOTE — PLAN OF CARE
Occupational Therapy Discharge Summary    Reason for therapy discharge:    All goals and outcomes met, no further needs identified.    Progress towards therapy goal(s). See goals on Care Plan in [facility] electronic health record for goal details.  Goals met    Therapy recommendation(s):    Continue home exercise program.

## 2022-12-05 NOTE — PLAN OF CARE
Problem: Risk for Delirium  Goal: Optimal Coping  Outcome: Progressing     Problem: Plan of Care - These are the overarching goals to be used throughout the patient stay.    Goal: Plan of Care Review  Description: The Plan of Care Review/Shift note should be completed every shift.  The Outcome Evaluation is a brief statement about your assessment that the patient is improving, declining, or no change.  This information will be displayed automatically on your shift note.  Outcome: Progressing   Goal Outcome Evaluation:             VSS.  NS rhythm on tele. O2 sats greater than 90% at room air.  Denied need for pain medication. Droplet precaution. Lung sounds diminished. Received scheduled IV rocephin. Daughter at the bedside. Slept most of the night.

## 2022-12-05 NOTE — PROGRESS NOTES
" Room air SpO2 97 %, BS diminished with crackles RML RLL LLL. Duoneb tx given, tolerated well. BS unchanged after    /65   Pulse 85   Temp 98.4  F (36.9  C) (Oral)   Resp 24   Ht 1.6 m (5' 3\")   Wt 73 kg (161 lb)   SpO2 97%   BMI 28.52 kg/m      RT TO MONITOR  "

## 2022-12-06 VITALS
DIASTOLIC BLOOD PRESSURE: 82 MMHG | SYSTOLIC BLOOD PRESSURE: 194 MMHG | WEIGHT: 161 LBS | RESPIRATION RATE: 21 BRPM | HEART RATE: 73 BPM | TEMPERATURE: 98.3 F | OXYGEN SATURATION: 97 % | BODY MASS INDEX: 28.53 KG/M2 | HEIGHT: 63 IN

## 2022-12-06 LAB
ANION GAP SERPL CALCULATED.3IONS-SCNC: 8 MMOL/L (ref 7–15)
BACTERIA SPT CULT: ABNORMAL
BACTERIA SPT CULT: ABNORMAL
BUN SERPL-MCNC: 4.2 MG/DL (ref 8–23)
CALCIUM SERPL-MCNC: 7.9 MG/DL (ref 8.8–10.2)
CHLORIDE SERPL-SCNC: 101 MMOL/L (ref 98–107)
CREAT SERPL-MCNC: 0.57 MG/DL (ref 0.51–0.95)
DEPRECATED HCO3 PLAS-SCNC: 27 MMOL/L (ref 22–29)
ERYTHROCYTE [DISTWIDTH] IN BLOOD BY AUTOMATED COUNT: 12.7 % (ref 10–15)
GFR SERPL CREATININE-BSD FRML MDRD: 88 ML/MIN/1.73M2
GLUCOSE SERPL-MCNC: 103 MG/DL (ref 70–99)
GRAM STAIN RESULT: ABNORMAL
HCT VFR BLD AUTO: 28.5 % (ref 35–47)
HGB BLD-MCNC: 9.4 G/DL (ref 11.7–15.7)
MAGNESIUM SERPL-MCNC: 1.9 MG/DL (ref 1.7–2.3)
MCH RBC QN AUTO: 30.5 PG (ref 26.5–33)
MCHC RBC AUTO-ENTMCNC: 33 G/DL (ref 31.5–36.5)
MCV RBC AUTO: 93 FL (ref 78–100)
PLATELET # BLD AUTO: 208 10E3/UL (ref 150–450)
POTASSIUM SERPL-SCNC: 3.6 MMOL/L (ref 3.4–5.3)
RBC # BLD AUTO: 3.08 10E6/UL (ref 3.8–5.2)
SODIUM SERPL-SCNC: 136 MMOL/L (ref 136–145)
WBC # BLD AUTO: 6.9 10E3/UL (ref 4–11)

## 2022-12-06 PROCEDURE — 250N000013 HC RX MED GY IP 250 OP 250 PS 637: Performed by: INTERNAL MEDICINE

## 2022-12-06 PROCEDURE — 36415 COLL VENOUS BLD VENIPUNCTURE: CPT | Performed by: INTERNAL MEDICINE

## 2022-12-06 PROCEDURE — 83735 ASSAY OF MAGNESIUM: CPT | Performed by: INTERNAL MEDICINE

## 2022-12-06 PROCEDURE — 80048 BASIC METABOLIC PNL TOTAL CA: CPT | Performed by: INTERNAL MEDICINE

## 2022-12-06 PROCEDURE — 99239 HOSP IP/OBS DSCHRG MGMT >30: CPT | Performed by: INTERNAL MEDICINE

## 2022-12-06 PROCEDURE — 250N000011 HC RX IP 250 OP 636: Performed by: FAMILY MEDICINE

## 2022-12-06 PROCEDURE — 85014 HEMATOCRIT: CPT | Performed by: INTERNAL MEDICINE

## 2022-12-06 RX ORDER — CEFDINIR 300 MG/1
300 CAPSULE ORAL EVERY 12 HOURS
Qty: 6 CAPSULE | Refills: 0 | Status: SHIPPED | OUTPATIENT
Start: 2022-12-07 | End: 2022-12-10

## 2022-12-06 RX ORDER — AZITHROMYCIN 250 MG/1
250 TABLET, FILM COATED ORAL DAILY
Qty: 2 TABLET | Refills: 0 | Status: SHIPPED | OUTPATIENT
Start: 2022-12-06 | End: 2022-12-08

## 2022-12-06 RX ORDER — CEFDINIR 300 MG/1
300 CAPSULE ORAL EVERY 12 HOURS SCHEDULED
Status: DISCONTINUED | OUTPATIENT
Start: 2022-12-06 | End: 2022-12-06 | Stop reason: HOSPADM

## 2022-12-06 RX ORDER — BENZONATATE 100 MG/1
100 CAPSULE ORAL 3 TIMES DAILY PRN
Qty: 15 CAPSULE | Refills: 0 | Status: SHIPPED | OUTPATIENT
Start: 2022-12-06

## 2022-12-06 RX ORDER — AZITHROMYCIN 250 MG/1
250 TABLET, FILM COATED ORAL DAILY
Status: DISCONTINUED | OUTPATIENT
Start: 2022-12-06 | End: 2022-12-06 | Stop reason: HOSPADM

## 2022-12-06 RX ADMIN — CEFTRIAXONE 2 G: 2 INJECTION, POWDER, FOR SOLUTION INTRAMUSCULAR; INTRAVENOUS at 00:56

## 2022-12-06 RX ADMIN — ACETAMINOPHEN 650 MG: 325 TABLET ORAL at 01:10

## 2022-12-06 RX ADMIN — Medication 1 CAPSULE: at 08:32

## 2022-12-06 RX ADMIN — PANTOPRAZOLE SODIUM 40 MG: 40 TABLET, DELAYED RELEASE ORAL at 08:32

## 2022-12-06 ASSESSMENT — ACTIVITIES OF DAILY LIVING (ADL)
ADLS_ACUITY_SCORE: 41
ADLS_ACUITY_SCORE: 38
ADLS_ACUITY_SCORE: 38
ADLS_ACUITY_SCORE: 41
ADLS_ACUITY_SCORE: 38

## 2022-12-06 NOTE — DISCHARGE INSTRUCTIONS
María Elena Ruff   MRN: [id number]  : 1935     Doctor recommendations at discharge:    -discharge meds: Cefdinir and Azithromycin, Vit D and Tessalon    -reason for your hospital stay: cough    -activity level: Activity    -diet: Regular diet    -follow up and recommended labs/tests: Follow up with primary care provider, [facility], within 2-3 days for hospital follow- up.  The following labs/tests are recommended: cbc ad bmp, needs to follow up Sodium.     -will need repeat CXR in 6 weeks

## 2022-12-06 NOTE — DISCHARGE SUMMARY
[facility] MEDICINE  DISCHARGE SUMMARY     Primary Care Physician: Herson Sweeney  Admission Date: 12/3/2022   Discharge Provider: Ariana Reyes MD Discharge Date: 12/6/2022   Diet:   Active Diet and Nourishment Order   Procedures     Combination Diet Regular Diet Adult     Diet       Code Status: Full Code   Activity: DCACTIVITY: Activity as tolerated        Condition at Discharge: Stable     REASON FOR PRESENTATION(See Admission Note for Details)   cough    PRINCIPAL & ACTIVE DISCHARGE DIAGNOSES     Principal Problem:    Hyponatremia  Active Problems:    Hypoxia    Multifocal pneumonia      PENDING LABS     Unresulted Labs Ordered in the Past 30 Days of this Admission     Date and Time Order Name Status Description    12/3/2022 11:11 PM Respiratory Aerobic Bacterial Culture Preliminary             PROCEDURES ( this hospitalization only)          RECOMMENDATIONS TO OUTPATIENT PROVIDER FOR F/U VISIT     Follow-up Appointments     Follow-up and recommended labs and tests       Follow up with primary care provider, Herson Sweeney, within 2-3   days for hospital follow- up.  The following labs/tests are recommended:   cbc ad bmp, needs to follow up Sodium.    -will need repeat CXR in 6 weeks                 DISPOSITION     Home    SUMMARY OF HOSPITAL COURSE:        87 year old female with unremarkable past medical history who presented to ER for evaluation of cough and shortness of breath, she was found to have Influenza A and bilateral lower lobe PNA.   She was admitted with     1.Acute Hypoxemic Respiratory Failure  - Secondary to pneumonia and influenza  - CXR consistent with PNA.   - Patient is also Flu A+.   - Continue IV abx- Rocephin & Azithromycin->switch to po now, omnicef +azithro  -off O2     2.Community Acquired Pneumonia  - Post influenza infection, patient has been sick for a week  - Pneumonia is seen on CXR.   - Continue antibiotics switch po  Omnicef +azithro     3.Acute Influenza A-  - positive sick contact as her grandson is positive since Thanksgiving.  - Patient has symptoms for more than a week.    - Patient is outside window for Tamiflu   - Improving slowly  - Continue supportive care     4.Acute hyponatremia  - Hypovolemic hyponatremic  - Patient has had diarrhea and decreased PO intake for a few days plus diarrhea.   -corrected stop ivf  -get NA check in clinic follow up     5.Diarrhea  - Likey 2/2 to influenza.  - Improving     6.Hypokalemia  - Secondary to diarrhea and poor oral intake  - Replace per protocol.     7.Weakness and deconditioning  - PT/OT evaluation  -greatly improved  -daughter will stay with her few days     8.Overweight  -bmi 28    Discharge Medications with Med changes:     Current Discharge Medication List      START taking these medications    Details   azithromycin (ZITHROMAX) 250 MG tablet Take 1 tablet (250 mg) by mouth daily for 2 days  Qty: 2 tablet, Refills: 0    Associated Diagnoses: Hypoxia      cefdinir (OMNICEF) 300 MG capsule Take 1 capsule (300 mg) by mouth every 12 hours for 3 days  Qty: 6 capsule, Refills: 0    Associated Diagnoses: Hypoxia         CONTINUE these medications which have NOT CHANGED    Details   cholecalciferol 25 MCG (1000 UT) TABS Take 1 tablet by mouth daily Vit D SPRAY at home      Multiple Vitamin (MULTIVITAMIN OR) Take 1 tablet by mouth daily Packet of multiple supplements/vitamins                   Rationale for medication changes:      omnicef--for CAP  azitrho--for CAP        Consults       CARE MANAGEMENT / SOCIAL WORK IP CONSULT  PHYSICAL THERAPY ADULT IP CONSULT  OCCUPATIONAL THERAPY ADULT IP CONSULT    Immunizations given this encounter       There is no immunization history on file for this patient.        Anticoagulation Information      Recent INR results: No results for input(s): INR in the last 168 hours.  Warfarin doses (if applicable) or name of other anticoagulant:  none      SIGNIFICANT IMAGING FINDINGS     Results for orders placed or performed during the hospital encounter of 12/03/22   XR Chest 2 Views    Impression    IMPRESSION: Consolidation of both lower lungs consistent with pneumonia. There also appears to be more mild patchy involvement in the right upper lobe. Mild interstitial prominence. Normal heart size. No pneumothorax. Degenerative changes of the right   glenohumeral joint.       SIGNIFICANT LABORATORY FINDINGS     Most Recent 3 CBC's:Recent Labs   Lab Test 12/06/22  0701 12/05/22  0715 12/04/22  0653   WBC 6.9 7.8 9.4   HGB 9.4* 9.5* 10.0*   MCV 93 92 89    184 147*     Most Recent 3 BMP's:Recent Labs   Lab Test 12/06/22  0631 12/05/22  0715 12/04/22  1444 12/04/22  0653    133*  --  129*   POTASSIUM 3.6 4.1 3.5 3.1*   CHLORIDE 101 99  --  94*   CO2 27 27  --  27   BUN 4.2* 4.7*  --  6.6*   CR 0.57 0.58  --  0.62   ANIONGAP 8 7  --  8   TEJAL 7.9* 8.0*  --  7.9*   * 97  --  105*     Most Recent 2 LFT's:Recent Labs   Lab Test 12/05/22  0715 12/03/22  2146   AST 35 57*   ALT 27 29   ALKPHOS 47 50   BILITOTAL 0.3 0.9       XR Chest 2 Views    Result Date: 12/3/2022  EXAM: XR CHEST 2 VIEWS LOCATION: [facility] DATE/TIME: 12/3/2022 10:09 PM INDICATION: Cough, shortness of breath COMPARISON: None.     IMPRESSION: Consolidation of both lower lungs consistent with pneumonia. There also appears to be more mild patchy involvement in the right upper lobe. Mild interstitial prominence. Normal heart size. No pneumothorax. Degenerative changes of the right glenohumeral joint.      Discharge Orders        Reason for your hospital stay    cough     Follow-up and recommended labs and tests     [facility], within 2-3 days for hospital follow- up.  The following labs/tests are recommended: cbc ad bmp, needs to follow up Sodium.    -will need repeat CXR in 6 weeks     Activity    Your  activity upon discharge: activity as tolerated     Diet    Follow this diet upon discharge: Orders Placed This Encounter      Combination Diet Regular Diet Adult       Examination   Physical Exam   Temp:  [98.4  F (36.9  C)-98.5  F (36.9  C)] 98.5  F (36.9  C)  Pulse:  [] 90  Resp:  [20-75] 56  BP: (138-156)/(65-72) 155/70  SpO2:  [93 %-97 %] 96 %  Wt Readings from Last 1 Encounters:   12/03/22 73 kg (161 lb)       See today's note      Please see EMR for more detailed significant labs, imaging, consultant notes etc.    I, Ariana Reyes MD, personally saw the patient today and spent greater than 30 minutes discharging this patient.    Ariana Reyes MD  [facility]    CC:Clinic, [facility]

## 2022-12-06 NOTE — TREATMENT PLAN
RCAT Treatment Plan    Patient Score: 6    Patient Acuity: 4    Clinical Indication for Therapy: pneumonia/Influenza     Therapy Ordered: Albuterol neb prn for shortness of breath.     Assessment Summary: SpO2 95% on RA, BS diminished with scatter course crackles- flutter valve order for pulmonary hygiene. RT will follow per protocol.

## 2022-12-06 NOTE — PROGRESS NOTES
[facility]    Medicine Progress Note - Hospitalist Service    Date of Admission:  12/3/2022    Assessment & Plan          87 year old female with unremarkable past medical history who presented to ER for evaluation of cough and shortness of breath, she was found to have Influenza A and bilateral lower lobe PNA.   She was admitted with     1.Acute Hypoxemic Respiratory Failure  - Secondary to pneumonia and influenza  - CXR consistent with PNA.   - Patient is also Flu A+.   - Continue IV abx- Rocephin & Azithromycin->switch to po now, omnicef +azithro  -off O2     2.Community Acquired Pneumonia  - Post influenza infection, patient has been sick for a week  - Pneumonia is seen on CXR.   - Continue antibiotics switch po Omnicef +azithro     3.Acute Influenza A-  - positive sick contact as her grandson is positive since Thanksgiving.  - Patient has symptoms for more than a week.    - Patient is outside window for Tamiflu   - Improving slowly  - Continue supportive care     4.Acute hyponatremia  - Hypovolemic hyponatremic  - Patient has had diarrhea and decreased PO intake for a few days plus diarrhea.   -corrected stop ivf  -get NA check in clinic follow up     5.Diarrhea  - Likey 2/2 to influenza.  - Improving     6.Hypokalemia  - Secondary to diarrhea and poor oral intake  - Replace per protocol.     7.Weakness and deconditioning  - PT/OT evaluation  -greatly improved  -daughter will stay with her few days    8.Overweight  -bmi 28                 Diet: Combination Diet Regular Diet Adult    DVT Prophylaxis: Pneumatic Compression Devices  Stubbs Catheter: Not present  Central Lines: None  Cardiac Monitoring: ACTIVE order. Indication: Hypoxic on arrival  Code Status: Full Code      Disposition Plan   Home today        The patient's care was discussed with the Patient and Patient's Family.    Ariana Reyes MD  Hospitalist Service  [facility]  Securely message with the  [employer] Console (learn more here)  Text page via [facility] Paging/Directory           ______________________________________________________________________    Interval History   She feels great  Less cough  Not on O2  Wants to go home  Walked more and not dizzy    Data reviewed today: I reviewed all medications, new labs and imaging results over the last 24 hours. I personally reviewed no images or EKG's today.    Physical Exam   Vital Signs: Temp: 98.5  F (36.9  C) Temp src: Oral BP: (!) 155/70 Pulse: 90   Resp: (!) 56 SpO2: 96 % O2 Device: None (Room air)    Weight: 161 lbs 0 oz  Constitutional: awake, alert, cooperative and no apparent distress  Respiratory: No increased work of breathing, good air exchange, clear to auscultation bilaterally, no crackles or wheezing  Cardiovascular: Normal apical impulse, regular rate and rhythm, normal S1 and S2, no S3 or S4, and no murmur noted  GI: normal bowel sounds, soft, non-distended and non-tender  Skin: no bruising or bleeding  Musculoskeletal: no lower extremity pitting edema present  Neurologic: Mental Status Exam:  Level of Alertness:   awake  Neuropsychiatric: General: normal, calm and normal eye contact    Data   Recent Labs   Lab 12/06/22  0701 12/06/22  0631 12/05/22  0715 12/04/22  1444 12/04/22  0653 12/04/22  0214 12/03/22  2146   WBC 6.9  --  7.8  --  9.4  --  12.1*   HGB 9.4*  --  9.5*  --  10.0*  --  11.3*   MCV 93  --  92  --  89  --  88     --  184  --  147*  --  188   NA  --  136 133*  --  129*   < > 119*   POTASSIUM  --  3.6 4.1 3.5 3.1*  --  3.5   CHLORIDE  --  101 99  --  94*  --  84*   CO2  --  27 27  --  27  --  23   BUN  --  4.2* 4.7*  --  6.6*  --  7.9*   CR  --  0.57 0.58  --  0.62  --  0.63   ANIONGAP  --  8 7  --  8  --  12   TEJAL  --  7.9* 8.0*  --  7.9*  --  8.4*   GLC  --  103* 97  --  105*  --  134*   ALBUMIN  --   --  2.8*  --   --   --  3.5   PROTTOTAL  --   --  5.4*  --   --   --  6.4   BILITOTAL  --   --  0.3  --   --   --  0.9    ALKPHOS  --   --  47  --   --   --  50   ALT  --   --  27  --   --   --  29   AST  --   --  35  --   --   --  57*    < > = values in this interval not displayed.     No results found for this or any previous visit (from the past 24 hour(s)).

## 2022-12-06 NOTE — PLAN OF CARE
Problem: Risk for Delirium  Goal: Improved Sleep  Outcome: Progressing     Problem: Risk for Delirium  Goal: Optimal Coping  Outcome: Progressing     Problem: Plan of Care - These are the overarching goals to be used throughout the patient stay.    Goal: Optimal Comfort and Wellbeing  Outcome: Progressing  Intervention: Monitor Pain and Promote Comfort  Recent Flowsheet Documentation  Taken 12/6/2022 0110 by Reanna Lux RN  Pain Management Interventions:    medication (see MAR)    pillow support provided    repositioned    rest   Goal Outcome Evaluation:    Patient alert, oriented x 3. Pleasant and co-operative. Daughter in room for support. Requested Tylenol to aid sleep since melatonin makes the patient to have nightmares. Noted with a dry cough. Tolerated Ceftriaxone infusion okay. Saturation 94-96 % % RA. Will continue to monitor the patient.

## 2022-12-06 NOTE — PROGRESS NOTES
Care Management Discharge Note    Discharge Date: 12/06/2022     Discharge Disposition: Home    Discharge Services: None    Discharge DME: None    Discharge Transportation:  (Family)    Private pay costs discussed: Not applicable    PAS Confirmation Code:  (NA)  Patient/family educated on Medicare website which has current facility and service quality ratings: no    Education Provided on the Discharge Plan:    Persons Notified of Discharge Plans: Nursing;   Patient/Family in Agreement with the Plan: yes    Handoff Referral Completed: No    Additional Information:  Discharge orders written. Discharging to home with family support, no CM needs identified.     Marisol Ward RN

## 2022-12-06 NOTE — PLAN OF CARE
"Goal Outcome Evaluation:    Pt on continuous NS running at 50mL/hr. Receiving IV abx.  On Tele.in NSR. CARLA FOSTER has been up with SBA with her daughter throughout the cristóbal.     /65   Pulse 103   Temp 98.4  F (36.9  C) (Oral)   Resp (!) 56   Ht 1.6 m (5' 3\")   Wt 73 kg (161 lb)   SpO2 97%   BMI 28.52 kg/m          Plan of Care Reviewed With: patient, family    Overall Patient Progress: improvingOverall Patient Progress: improving      Problem: Plan of Care - These are the overarching goals to be used throughout the patient stay.    Goal: Absence of Hospital-Acquired Illness or Injury  Intervention: Identify and Manage Fall Risk  Recent Flowsheet Documentation  Taken 12/5/2022 1642 by Delilah Arnold RN  Safety Promotion/Fall Prevention: activity supervised     Problem: Plan of Care - These are the overarching goals to be used throughout the patient stay.    Goal: Absence of Hospital-Acquired Illness or Injury  Intervention: Prevent Skin Injury  Recent Flowsheet Documentation  Taken 12/5/2022 1642 by Delilah Arnold RN  Body Position: position changed independently     Problem: Plan of Care - These are the overarching goals to be used throughout the patient stay.    Goal: Absence of Hospital-Acquired Illness or Injury  Intervention: Prevent and Manage VTE (Venous Thromboembolism) Risk  Recent Flowsheet Documentation  Taken 12/5/2022 1642 by Delilah Arnold RN  VTE Prevention/Management: SCDs (sequential compression devices) off     Problem: Plan of Care - These are the overarching goals to be used throughout the patient stay.    Goal: Optimal Comfort and Wellbeing  Outcome: Progressing     Problem: Plan of Care - These are the overarching goals to be used throughout the patient stay.    Goal: Readiness for Transition of Care  Outcome: Progressing            "

## 2022-12-06 NOTE — ED NOTES
ER DISCHARGE NOTE:   María Elena Ruff MRN: [id number]      María Elena Ruff is discharged from the emergency room.    (J18.9) Multifocal pneumonia    (R09.02) Hypoxia  Plan: azithromycin (ZITHROMAX) 250 MG tablet,         cefdinir (OMNICEF) 300 MG capsule    (E87.1) Hyponatremia    (J10.1) Influenza A  Plan: benzonatate (TESSALON) 100 MG capsule        María Elena Ruff discharged via on foot with dtr.    Verbalized understanding of discharge instructions.   Prescriptions: e-prescribed.    AVS in hand.

## 2022-12-06 NOTE — ED NOTES
Checked on pt due to her IV pump beeping.Checked IV site left upper arm and noted it to be infiltrated. Restart IV and restart fluid as ordered. Left upper arm IV dc'd and warm pack placed. Pt's daughter will remove warm pack in 15-20 minutes. Reported to Delilah TUCKER pt's nurse.

## 2023-02-05 ENCOUNTER — HEALTH MAINTENANCE LETTER (OUTPATIENT)
Age: 88
End: 2023-02-05

## 2024-03-09 ENCOUNTER — HEALTH MAINTENANCE LETTER (OUTPATIENT)
Age: 89
End: 2024-03-09

## 2025-03-16 ENCOUNTER — HEALTH MAINTENANCE LETTER (OUTPATIENT)
Age: 89
End: 2025-03-16